# Patient Record
Sex: FEMALE | Race: WHITE
[De-identification: names, ages, dates, MRNs, and addresses within clinical notes are randomized per-mention and may not be internally consistent; named-entity substitution may affect disease eponyms.]

---

## 2019-07-29 ENCOUNTER — HOSPITAL ENCOUNTER (EMERGENCY)
Dept: HOSPITAL 61 - ER | Age: 29
Discharge: HOME | End: 2019-07-29
Payer: COMMERCIAL

## 2019-07-29 VITALS — DIASTOLIC BLOOD PRESSURE: 83 MMHG | SYSTOLIC BLOOD PRESSURE: 124 MMHG

## 2019-07-29 VITALS — BODY MASS INDEX: 21.69 KG/M2 | HEIGHT: 66 IN | WEIGHT: 135 LBS
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
## 2019-07-29 DIAGNOSIS — S90.02XA: Primary | ICD-10-CM

## 2019-07-29 PROCEDURE — 99284 EMERGENCY DEPT VISIT MOD MDM: CPT

## 2019-07-29 PROCEDURE — 73610 X-RAY EXAM OF ANKLE: CPT

## 2019-07-29 NOTE — RAD
EXAM: 3 views left ankle

 

DATE: 7/29/2019 10:28 PM

 

INDICATION: Left lateral ankle pain, injury

 

COMPARISON: No Prior

 

FINDINGS/

IMPRESSION:

1.  No evidence of acute fracture or dislocation. 

2.  Joint spaces are preserved without significant 

degenerative/proliferative change. 

3.  Ankle mortise is congruent. Talar dome is intact.

4.  Mild soft tissue swelling overlying the lateral malleolus.

 

Electronically signed by: Shar Jade MD (7/29/2019 11:40 PM) [facility]

## 2019-07-29 NOTE — PHYS DOC
Past Medical History


Past Medical History:  No Pertinent History


 (ELIGIO KNOTT)


Past Surgical History:  No Surgical History


 (ELIGIO KNOTT)


Alcohol Use:  None


Drug Use:  None


 (ELIGIO KNOTT)





Adult General


Chief Complaint


Chief Complaint:  ANKLE PROBLEM





HPI


HPI





Patient is a 28  year old female who presents with 2 out of 10 left lateral 

ankle pain that began today after she hit her left ankle on a wooden stove. Pat

ient states the pain is worse on touching the area. Denies anything specifically

relieving the pain. She describes the pain as sharp and intermittent.


 (ELIGIO KNOTT)





Review of Systems


Review of Systems





Constitutional: Denies fever or chills []





Musculoskeletal: Reports left ankle pain


Integument: Denies rash or skin lesions []


Neurologic: Denies headache, focal weakness or sensory changes []








All other systems were reviewed and found to be within normal limits, except as 

documented in this note.


 (ELIGIO KNOTT)





Allergies


Allergies





Allergies








Coded Allergies Type Severity Reaction Last Updated Verified


 


  No Known Drug Allergies    1/20/14 No





 (DIONNE PEREZ DO)





Physical Exam


Physical Exam





Constitutional: Well developed, well nourished, no acute distress, non-toxic 

appearance. []





Skin: Warm, dry, no erythema, no rash. [] 


Back: No tenderness, no CVA tenderness. [] 


Extremities: Left ankle with no obvious deformity. Slight tenderness on 

palpation of the left lateral ankle. Full range of motion to the left ankle and 

toes. +2 left pedal pulse. Cap refill less than 2 seconds the left toes.


Neurologic: Alert and oriented X 3, normal motor function, normal sensory 

function, no focal deficits noted. []


Psychologic: Affect normal, judgement normal, mood normal. []


 (ELIGIO KNOTT)





Current Patient Data


Vital Signs





                                   Vital Signs








  Date Time  Temp Pulse Resp B/P (MAP) Pulse Ox O2 Delivery O2 Flow Rate FiO2


 


7/29/19 21:10 98.6 111 16 124/83 (97) 95 Room Air  





 98.6       





 (DIONNE PEREZ DO)





EKG


EKG


[]


 (ELIGIO KNOTT)





Radiology/Procedures


Radiology/Procedures


[]


 (ELIGIO KNOTT)


Radiology/Procedures


PROCEDURE: ANKLE LEFT 3V





EXAM: 3 views left ankle


 


DATE: 7/29/2019 10:28 PM


 


INDICATION: Left lateral ankle pain, injury


 


COMPARISON: No Prior


 


FINDINGS/


IMPRESSION:


1.  No evidence of acute fracture or dislocation. 


2.  Joint spaces are preserved without significant 


degenerative/proliferative change. 


3.  Ankle mortise is congruent. Talar dome is intact.


4.  Mild soft tissue swelling overlying the lateral malleolus.


 


Electronically signed by: Shar Jade MD (7/29/2019 11:40 PM) [facility]





 (DIONEN PEREZ DO)





Course & Med Decision Making


Course & Med Decision Making


Pertinent Labs and Imaging studies reviewed. (See chart for details)





This is a 28-year-old female patient presenting to the ED today with left ankle 

pain after hitting her left ankle on a wooden stove. Left ankle x-rays 

interpreted by Dr. Perez are negative for any acute findings. Ace wrap bandage 

applied to the left ankle by me. Neurovascular exam is intact. Ice elevation 

encouraged. Follow-up with orthopedic doctor as needed. OTC pain relievers.





 (ELIGIO KNOTT)





Dragon Disclaimer


Dragon Disclaimer


This electronic medical record was generated, in whole or in part, using a voice

 recognition dictation system.


 (ELIGIO KNOTT)





Splinting


Splinting :  


   Location:  left ankle


   Pre-Made Type:  Ace bandage


   Pre-Proc Neuro Vasc Exam:  normal


   Post-Proc Neuro Vasc Exam:  normal, unchanged from pre-exam


 (DIONNE PEREZ DO)





Departure


Departure


Impression:  


   Primary Impression:  


   Contusion of left ankle


Disposition:  01 HOME, SELF-CARE


Condition:  STABLE


Referrals:  


NO PCP (PCP)








DAPHNEY BARRIENTOS MD


follow up in 2 weeks


Patient Instructions:  Contusion, Easy-to-Read





Additional Instructions:  


You were evaluated in the emergency room for left ankle contusion. Your left 

ankle x-rays are negative for any acute findings. Try to ice and elevate the 

extremity. You can take over-the-counter pain relievers as needed. Use the Ace 

bandage provided as tolerated and needed. Follow-up with orthopedic doctor 

provided in 1-2 weeks.





Attending Signature


Attending Signature


I have reviewed the PA/NP's note and plan of care. I was available for co

nsultation as needed during the patient's visit in the emergency department. I 

agree with the clinical impression, plan, and disposition.


 (DIONNE PEREZ DO)





Problem Qualifiers








   Primary Impression:  


   Contusion of left ankle


   Encounter type:  initial encounter  Qualified Codes:  S90.02XA - Contusion of

    left ankle, initial encounter








KAYLEEELIGIO SORIA ROGELIO              Jul 29, 2019 23:10


DIONNE PEREZ DO             Jul 30, 2019 02:39